# Patient Record
Sex: FEMALE | Race: OTHER | NOT HISPANIC OR LATINO | URBAN - METROPOLITAN AREA
[De-identification: names, ages, dates, MRNs, and addresses within clinical notes are randomized per-mention and may not be internally consistent; named-entity substitution may affect disease eponyms.]

---

## 2024-08-02 ENCOUNTER — OFFICE VISIT (OUTPATIENT)
Dept: URGENT CARE | Facility: CLINIC | Age: 44
End: 2024-08-02
Payer: MEDICAID

## 2024-08-02 VITALS
HEART RATE: 88 BPM | WEIGHT: 100 LBS | BODY MASS INDEX: 17.72 KG/M2 | DIASTOLIC BLOOD PRESSURE: 86 MMHG | TEMPERATURE: 98 F | RESPIRATION RATE: 18 BRPM | SYSTOLIC BLOOD PRESSURE: 126 MMHG | OXYGEN SATURATION: 98 % | HEIGHT: 63 IN

## 2024-08-02 DIAGNOSIS — R55 SYNCOPE AND COLLAPSE: Primary | ICD-10-CM

## 2024-08-02 DIAGNOSIS — R42 DIZZINESS OF UNKNOWN ETIOLOGY: ICD-10-CM

## 2024-08-02 LAB
CTP QC/QA: YES
GLUCOSE SERPL-MCNC: 96 MG/DL (ref 70–110)
SARS-COV-2 AG RESP QL IA.RAPID: NEGATIVE

## 2024-08-02 NOTE — PROGRESS NOTES
"Subjective:      Patient ID: Fran Sierra is a 44 y.o. female.    Vitals:  height is 5' 3" (1.6 m) and weight is 45.4 kg (100 lb). Her oral temperature is 97.5 °F (36.4 °C). Her blood pressure is 126/86 and her pulse is 88. Her respiration is 18 and oxygen saturation is 98%.     Chief Complaint: Dizziness    Pt presents today with dizziness, unable to keep balance, sweats, vision seems really dark sx started 4 hrs ago, no treatments tried,   Provider note below:  This is a 44 y.o. female hypertension, anemia, hypothyroidism who presents today with a chief complaint of dizziness that started 4 hours ago, patient reports that all of sudden she felt everything turned black in front of her and she could not see started feeling dizziness  got the chills and sweats and fell on the ground, patient reports she can not recall getting herself up hardly from the floor to the couch but unsure if she lost consciousness completely, patient reports she felt like "she is not here anymore, not in this world anymore" patient reports she was able to call her daughter and at that time they checked her blood pressure and it was 88/49 and her pulse was 30, denies any exertional chest pain, denies any radiating neck or arm pain, patient reports she did took 3 half cups of splint sugar and then she was able to get up a little, patient reports she does take medication for high blood pressure HCTZ, amlodipine, she takes medication levothyroxine famotidine and vitamin-D, denies any prior episode of syncope or dizziness,  denies fever, body aches or chills, denies cough, wheezing or shortness of breath, denies nausea, vomiting, diarrhea or abdominal pain, denies chest pain, denies slurred speech, denies loss of hand  weakness, denies bladder or bowel incontinence, denies facial weakness, denies any numbness or tingling to face, denies sore throat or trouble swallowing, denies loss of taste or smell, or any other symptoms   "           Dizziness:   Chronicity:  New  Onset:  Today  Progression since onset:  Gradually worsening  Frequency:  Constantly  Pain Scale:  0/10  Duration:  Off/on all day  Dizziness characteristics:  Off-balance, walking on uneven surface, spinning inside head only and trouble focusing eyes   Associated symptoms: headaches, visual disturbances and light-headedness.  Aggravated by:  Nothing  Treatments tried:  Nothing  Improvements on treatment:  No relief      Neurological:  Positive for dizziness, light-headedness and headaches.      Objective:     Physical Exam   Constitutional: She is oriented to person, place, and time. She appears well-developed.  Non-toxic appearance. She does not appear ill. No distress.   HENT:   Head: Normocephalic and atraumatic.   Ears:   Right Ear: Hearing, tympanic membrane, external ear and ear canal normal.   Left Ear: Hearing, tympanic membrane, external ear and ear canal normal.   Nose: Nose normal. No mucosal edema, rhinorrhea or nasal deformity. No epistaxis. Right sinus exhibits no maxillary sinus tenderness and no frontal sinus tenderness. Left sinus exhibits no maxillary sinus tenderness and no frontal sinus tenderness.   Mouth/Throat: Uvula is midline, oropharynx is clear and moist and mucous membranes are normal. No trismus in the jaw. Normal dentition. No uvula swelling. No posterior oropharyngeal erythema.   Eyes: Conjunctivae, EOM and lids are normal. Pupils are equal, round, and reactive to light. No scleral icterus. Extraocular movement intact   Neck: Trachea normal and phonation normal. Neck supple. No neck rigidity present.   Cardiovascular: Normal rate, regular rhythm, normal heart sounds and normal pulses.      Comments: EKG NSR vent rate 67   Pulmonary/Chest: Effort normal and breath sounds normal. No respiratory distress.   Abdominal: Normal appearance and bowel sounds are normal. She exhibits no distension. Soft. There is no abdominal tenderness.    Musculoskeletal: Normal range of motion.         General: No deformity. Normal range of motion.   Neurological: no focal deficit. She is alert and oriented to person, place, and time. No cranial nerve deficit. She exhibits normal muscle tone. Coordination normal.      Comments: normal EOM, no nystagmus, normal sensation to face and extremities, CN intact   Skin: Skin is warm, dry, intact, not diaphoretic and not pale.   Psychiatric: Her speech is normal and behavior is normal. Judgment and thought content normal.   Nursing note and vitals reviewed.    Results for orders placed or performed in visit on 08/02/24   POCT Glucose, Hand-Held Device   Result Value Ref Range    POC Glucose 96 70 - 110 MG/DL   SARS Coronavirus 2 Antigen, POCT Manual Read   Result Value Ref Range    SARS Coronavirus 2 Antigen Negative Negative     Acceptable Yes      EKG: normal EKG, normal sinus rhythm        Patient in no acute distress.  Vitals reassuring.  Discussed results/diagnosis/plan in depth with patient in clinic. Strict precautions given to patient to monitor for worsening signs and symptoms. Advised to follow up with primary.All questions answered. Strict ER precautions given. If your symptoms worsens or fail to improve you should go to the Emergency Room. Discharge and follow-up instructions given verbally/printed. Discharge and follow-up instructions discussed with the patient who expressed understanding and willingness to comply with my recommendations.Patient voiced understanding and in agreement with current treatment plan.     Please be advised this text was dictated with [employer] software and may contain errors due to translation.     Assessment:     1. Syncope and collapse    2. Dizziness of unknown etiology        Plan:       Syncope and collapse  -     SARS Coronavirus 2 Antigen, POCT Manual Read    Dizziness of unknown etiology  -     POCT Glucose, Hand-Held Device  -     Orthostatic vital signs  -      EKG 12-lead  -     SARS Coronavirus 2 Antigen, POCT Manual Read          Medical Decision Making:   Urgent Care Management:  44-year-old female with history of anemia, hypothyroidism, hypertension presented to the urgent care with complaint of dizziness with syncope and collapse 4 hours ago.  Patient denies any exertional chest pain, denies radiating neck or arm pain, denies any slurred speech.  Patient reports she passed out but unsure if she lost consciousness completely or not as she was hardly able to get herself on the couch.  Patient reports at that time the BP was 88/49 and her pulse was 30.  Patient reports her daughter give her 3 half cups of splint a sugar with water and after that she felt little better but still feeling dizziness.  POCT glucose, orthostatic vital signs as well as EKG results discussed with patient and  in detail.  Patient accompanied by  in the clinic.  Based on patient presenting symptoms I discussed with patient and  that we can not rule out all all the possible causes of her symptoms in the urgent care setting and based on her symptoms of syncope and collapse with questionable loss of consciousness she will need further evaluation in the ED with high level imaging and lab work.  Patient and  verbalized understanding and will go to nearest emergency room for further evaluation.  Patient will go to nearest emergency room via POV with .         Patient Instructions   GO STRAIGHT TO THE ER AND DO NOT EAT OR DRINK ANYTHING UNLESS A HEALTHCARE PROVIDER GIVES IT TO YOU.

## 2024-08-03 LAB
OHS QRS DURATION: 76 MS
OHS QTC CALCULATION: 420 MS